# Patient Record
Sex: MALE | Race: ASIAN | NOT HISPANIC OR LATINO | Employment: FULL TIME
[De-identification: names, ages, dates, MRNs, and addresses within clinical notes are randomized per-mention and may not be internally consistent; named-entity substitution may affect disease eponyms.]

---

## 2018-06-29 ENCOUNTER — HOSPITAL ENCOUNTER (OUTPATIENT)
Dept: LAB | Age: 28
Setting detail: SPECIMEN
Discharge: HOME | End: 2018-06-29

## 2018-06-29 ENCOUNTER — AMBULATORY - HEALTHEAST (OUTPATIENT)
Dept: LAB | Facility: CLINIC | Age: 28
End: 2018-06-29

## 2018-06-29 DIAGNOSIS — Z02.89 REFUGEE HEALTH EXAMINATION: ICD-10-CM

## 2018-06-29 LAB
ALBUMIN SERPL-MCNC: 4.1 G/DL (ref 3.5–5)
ALP SERPL-CCNC: 76 U/L (ref 45–120)
ALT SERPL W P-5'-P-CCNC: 19 U/L (ref 0–45)
ANION GAP SERPL CALCULATED.3IONS-SCNC: 12 MMOL/L (ref 5–18)
AST SERPL W P-5'-P-CCNC: 16 U/L (ref 0–40)
BASOPHILS # BLD AUTO: 0.1 THOU/UL (ref 0–0.2)
BASOPHILS NFR BLD AUTO: 1 % (ref 0–2)
BILIRUB SERPL-MCNC: 0.9 MG/DL (ref 0–1)
BUN SERPL-MCNC: 15 MG/DL (ref 8–22)
CALCIUM SERPL-MCNC: 9.6 MG/DL (ref 8.5–10.5)
CHLORIDE BLD-SCNC: 103 MMOL/L (ref 98–107)
CO2 SERPL-SCNC: 24 MMOL/L (ref 22–31)
CREAT SERPL-MCNC: 0.88 MG/DL (ref 0.7–1.3)
EOSINOPHIL # BLD AUTO: 0.3 THOU/UL (ref 0–0.4)
EOSINOPHIL NFR BLD AUTO: 5 % (ref 0–6)
ERYTHROCYTE [DISTWIDTH] IN BLOOD BY AUTOMATED COUNT: 12.6 % (ref 11–14.5)
GFR SERPL CREATININE-BSD FRML MDRD: >60 ML/MIN/1.73M2
GLUCOSE BLD-MCNC: 90 MG/DL (ref 70–125)
HAV IGG SER QL IA: POSITIVE
HBV CORE AB SERPL QL IA: NEGATIVE
HBV SURFACE AG SERPL QL IA: NEGATIVE
HCT VFR BLD AUTO: 48.5 % (ref 40–54)
HCV AB SERPL QL IA: NEGATIVE
HEPATITIS B SURFACE ANTIBODY LHE- HISTORICAL: POSITIVE
HGB BLD-MCNC: 16.2 G/DL (ref 14–18)
HIV 1+2 AB+HIV1 P24 AG SERPL QL IA: NEGATIVE
LDLC SERPL CALC-MCNC: 88 MG/DL
LYMPHOCYTES # BLD AUTO: 1.7 THOU/UL (ref 0.8–4.4)
LYMPHOCYTES NFR BLD AUTO: 34 % (ref 20–40)
MCH RBC QN AUTO: 29.8 PG (ref 27–34)
MCHC RBC AUTO-ENTMCNC: 33.4 G/DL (ref 32–36)
MCV RBC AUTO: 89 FL (ref 80–100)
MONOCYTES # BLD AUTO: 0.5 THOU/UL (ref 0–0.9)
MONOCYTES NFR BLD AUTO: 9 % (ref 2–10)
NEUTROPHILS # BLD AUTO: 2.6 THOU/UL (ref 2–7.7)
NEUTROPHILS NFR BLD AUTO: 51 % (ref 50–70)
PLATELET # BLD AUTO: 227 THOU/UL (ref 140–440)
PMV BLD AUTO: 11.9 FL (ref 8.5–12.5)
POTASSIUM BLD-SCNC: 3.9 MMOL/L (ref 3.5–5)
PROT SERPL-MCNC: 7.5 G/DL (ref 6–8)
RBC # BLD AUTO: 5.43 MILL/UL (ref 4.4–6.2)
SODIUM SERPL-SCNC: 139 MMOL/L (ref 136–145)
WBC: 5.2 THOU/UL (ref 4–11)

## 2018-06-30 LAB — STRONGYLOIDES IGG SER IA-ACNC: 0.62 IV

## 2018-07-02 LAB
QTF INTERPRETATION: NORMAL
QTF MITOGEN - NIL: 7.75 IU/ML
QTF NIL: 0.08 IU/ML
QTF RESULT: NEGATIVE
QTF TB ANTIGEN - NIL: 0.01 IU/ML
VZV IGG SER QL IA: POSITIVE

## 2018-07-03 LAB — SCHISTOSOMA IGG SER QL: POSITIVE

## 2018-07-12 ENCOUNTER — OFFICE VISIT - HEALTHEAST (OUTPATIENT)
Dept: FAMILY MEDICINE | Facility: CLINIC | Age: 28
End: 2018-07-12

## 2018-08-01 ENCOUNTER — HOSPITAL ENCOUNTER (OUTPATIENT)
Dept: LAB | Age: 28
Setting detail: SPECIMEN
Discharge: HOME | End: 2018-08-01

## 2018-08-01 ENCOUNTER — OFFICE VISIT - HEALTHEAST (OUTPATIENT)
Dept: FAMILY MEDICINE | Facility: CLINIC | Age: 28
End: 2018-08-01

## 2018-08-01 DIAGNOSIS — R94.31 NONSPECIFIC ABNORMAL ELECTROCARDIOGRAM (ECG) (EKG): ICD-10-CM

## 2018-08-01 DIAGNOSIS — B65.9 SCHISTOSOMIASIS: ICD-10-CM

## 2018-08-01 DIAGNOSIS — Z02.89 REFUGEE HEALTH EXAMINATION: ICD-10-CM

## 2018-08-01 DIAGNOSIS — K62.5 BRBPR (BRIGHT RED BLOOD PER RECTUM): ICD-10-CM

## 2018-08-01 DIAGNOSIS — Z00.00 ROUTINE GENERAL MEDICAL EXAMINATION AT A HEALTH CARE FACILITY: ICD-10-CM

## 2018-08-01 DIAGNOSIS — R55 SYNCOPE, UNSPECIFIED SYNCOPE TYPE: ICD-10-CM

## 2018-08-01 LAB
ANION GAP SERPL CALCULATED.3IONS-SCNC: 10 MMOL/L (ref 5–18)
BASOPHILS # BLD AUTO: 0 THOU/UL (ref 0–0.2)
BASOPHILS NFR BLD AUTO: 1 % (ref 0–2)
BUN SERPL-MCNC: 10 MG/DL (ref 8–22)
CALCIUM SERPL-MCNC: 9.5 MG/DL (ref 8.5–10.5)
CHLORIDE BLD-SCNC: 106 MMOL/L (ref 98–107)
CO2 SERPL-SCNC: 26 MMOL/L (ref 22–31)
CREAT SERPL-MCNC: 0.81 MG/DL (ref 0.7–1.3)
EOSINOPHIL # BLD AUTO: 0.3 THOU/UL (ref 0–0.4)
EOSINOPHIL NFR BLD AUTO: 5 % (ref 0–6)
ERYTHROCYTE [DISTWIDTH] IN BLOOD BY AUTOMATED COUNT: 12.3 % (ref 11–14.5)
FERRITIN SERPL-MCNC: 192 NG/ML (ref 27–300)
GFR SERPL CREATININE-BSD FRML MDRD: >60 ML/MIN/1.73M2
GLUCOSE BLD-MCNC: 77 MG/DL (ref 70–125)
HCT VFR BLD AUTO: 46.8 % (ref 40–54)
HGB BLD-MCNC: 16 G/DL (ref 14–18)
LYMPHOCYTES # BLD AUTO: 2 THOU/UL (ref 0.8–4.4)
LYMPHOCYTES NFR BLD AUTO: 36 % (ref 20–40)
MCH RBC QN AUTO: 29.7 PG (ref 27–34)
MCHC RBC AUTO-ENTMCNC: 34.2 G/DL (ref 32–36)
MCV RBC AUTO: 87 FL (ref 80–100)
MONOCYTES # BLD AUTO: 0.5 THOU/UL (ref 0–0.9)
MONOCYTES NFR BLD AUTO: 9 % (ref 2–10)
NEUTROPHILS # BLD AUTO: 2.7 THOU/UL (ref 2–7.7)
NEUTROPHILS NFR BLD AUTO: 49 % (ref 50–70)
PLATELET # BLD AUTO: 222 THOU/UL (ref 140–440)
PMV BLD AUTO: 8.7 FL (ref 7–10)
POTASSIUM BLD-SCNC: 3.8 MMOL/L (ref 3.5–5)
RBC # BLD AUTO: 5.39 MILL/UL (ref 4.4–6.2)
SODIUM SERPL-SCNC: 142 MMOL/L (ref 136–145)
WBC: 5.5 THOU/UL (ref 4–11)

## 2018-08-01 RX ORDER — ACETAMINOPHEN 325 MG/1
650 TABLET ORAL EVERY 4 HOURS PRN
Qty: 100 TABLET | Refills: 2 | Status: SHIPPED | OUTPATIENT
Start: 2018-08-01

## 2018-08-01 ASSESSMENT — MIFFLIN-ST. JEOR: SCORE: 1476.63

## 2018-08-02 LAB
ATRIAL RATE - MUSE: 75 BPM
DIASTOLIC BLOOD PRESSURE - MUSE: NORMAL MMHG
INTERPRETATION ECG - MUSE: NORMAL
P AXIS - MUSE: 47 DEGREES
PR INTERVAL - MUSE: 154 MS
QRS DURATION - MUSE: 98 MS
QT - MUSE: 386 MS
QTC - MUSE: 431 MS
R AXIS - MUSE: 68 DEGREES
SYSTOLIC BLOOD PRESSURE - MUSE: NORMAL MMHG
T AXIS - MUSE: 23 DEGREES
VENTRICULAR RATE- MUSE: 75 BPM

## 2018-08-03 ENCOUNTER — COMMUNICATION - HEALTHEAST (OUTPATIENT)
Dept: FAMILY MEDICINE | Facility: CLINIC | Age: 28
End: 2018-08-03

## 2018-08-14 ENCOUNTER — OFFICE VISIT - HEALTHEAST (OUTPATIENT)
Dept: CARDIOLOGY | Facility: CLINIC | Age: 28
End: 2018-08-14

## 2018-08-14 DIAGNOSIS — T67.1XXA HEAT SYNCOPE, INITIAL ENCOUNTER: ICD-10-CM

## 2018-08-14 ASSESSMENT — MIFFLIN-ST. JEOR: SCORE: 1469.9

## 2018-08-15 LAB
ATRIAL RATE - MUSE: 65 BPM
DIASTOLIC BLOOD PRESSURE - MUSE: NORMAL MMHG
INTERPRETATION ECG - MUSE: NORMAL
P AXIS - MUSE: 33 DEGREES
PR INTERVAL - MUSE: 152 MS
QRS DURATION - MUSE: 94 MS
QT - MUSE: 378 MS
QTC - MUSE: 393 MS
R AXIS - MUSE: 61 DEGREES
SYSTOLIC BLOOD PRESSURE - MUSE: NORMAL MMHG
T AXIS - MUSE: 33 DEGREES
VENTRICULAR RATE- MUSE: 65 BPM

## 2018-08-22 ENCOUNTER — OFFICE VISIT - HEALTHEAST (OUTPATIENT)
Dept: FAMILY MEDICINE | Facility: CLINIC | Age: 28
End: 2018-08-22

## 2018-08-29 ENCOUNTER — COMMUNICATION - HEALTHEAST (OUTPATIENT)
Dept: ADMINISTRATIVE | Facility: CLINIC | Age: 28
End: 2018-08-29

## 2018-10-18 ENCOUNTER — HOSPITAL ENCOUNTER (OUTPATIENT)
Dept: CARDIOLOGY | Facility: HOSPITAL | Age: 28
Discharge: HOME | End: 2018-10-18
Attending: INTERNAL MEDICINE

## 2018-10-18 DIAGNOSIS — T67.1XXA HEAT SYNCOPE, INITIAL ENCOUNTER: ICD-10-CM

## 2018-10-18 ASSESSMENT — MIFFLIN-ST. JEOR: SCORE: 1469.9

## 2018-10-19 LAB
AORTIC ROOT: 3.3 CM
BSA FOR ECHO PROCEDURE: 1.66 M2
CV BLOOD PRESSURE: NORMAL MMHG
CV ECHO HEIGHT: 62.2 IN
CV ECHO WEIGHT: 138 LBS
DOP CALC LVOT AREA: 4.15 CM2
DOP CALC LVOT DIAMETER: 2.3 CM
DOP CALC LVOT STROKE VOLUME: 64.4 CM3
DOP CALCLVOT PEAK VEL VTI: 15.5 CM
EJECTION FRACTION: 56 % (ref 55–75)
FRACTIONAL SHORTENING: 30.2 % (ref 28–44)
INTERVENTRICULAR SEPTUM IN END DIASTOLE: 1 CM (ref 0.6–1)
IVS/PW RATIO: 1.3
LA AREA 1: 12.8 CM2
LA AREA 2: 15 CM2
LEFT ATRIUM LENGTH: 4.97 CM
LEFT ATRIUM SIZE: 2.8 CM
LEFT ATRIUM TO AORTIC ROOT RATIO: 0.85 NO UNITS
LEFT ATRIUM VOLUME INDEX: 19.8 ML/M2
LEFT ATRIUM VOLUME: 32.8 ML
LEFT VENTRICLE CARDIAC INDEX: 2.7 L/MIN/M2
LEFT VENTRICLE CARDIAC OUTPUT: 4.5 L/MIN
LEFT VENTRICLE DIASTOLIC VOLUME INDEX: 69.3 CM3/M2 (ref 34–74)
LEFT VENTRICLE DIASTOLIC VOLUME: 115 CM3 (ref 62–150)
LEFT VENTRICLE HEART RATE: 70 BPM
LEFT VENTRICLE MASS INDEX: 74.3 G/M2
LEFT VENTRICLE SYSTOLIC VOLUME INDEX: 30.7 CM3/M2 (ref 11–31)
LEFT VENTRICLE SYSTOLIC VOLUME: 51 CM3 (ref 21–61)
LEFT VENTRICULAR INTERNAL DIMENSION IN DIASTOLE: 4.3 CM (ref 4.2–5.8)
LEFT VENTRICULAR INTERNAL DIMENSION IN SYSTOLE: 3 CM (ref 2.5–4)
LEFT VENTRICULAR MASS: 123.3 G
LEFT VENTRICULAR OUTFLOW TRACT MEAN GRADIENT: 1 MMHG
LEFT VENTRICULAR OUTFLOW TRACT MEAN VELOCITY: 49 CM/S
LEFT VENTRICULAR POSTERIOR WALL IN END DIASTOLE: 0.8 CM (ref 0.6–1)
LV STROKE VOLUME INDEX: 38.8 ML/M2
MITRAL VALVE E/A RATIO: 1.4
MV AVERAGE E/E' RATIO: 4.6 CM/S
MV DECELERATION TIME: 148 MS
MV E'TISSUE VEL-LAT: 15 CM/S
MV E'TISSUE VEL-MED: 9.46 CM/S
MV LATERAL E/E' RATIO: 3.8
MV MEDIAL E/E' RATIO: 6
MV PEAK A VELOCITY: 41.3 CM/S
MV PEAK E VELOCITY: 56.8 CM/S
NUC REST DIASTOLIC VOLUME INDEX: 2212.8 LBS
NUC REST SYSTOLIC VOLUME INDEX: 62.21 IN
TRICUSPID REGURGITATION PEAK PRESSURE GRADIENT: 11.3 MMHG
TRICUSPID VALVE ANULAR PLANE SYSTOLIC EXCURSION: 1.9 CM
TRICUSPID VALVE PEAK REGURGITANT VELOCITY: 168 CM/S

## 2018-10-26 ENCOUNTER — COMMUNICATION - HEALTHEAST (OUTPATIENT)
Dept: CARDIOLOGY | Facility: CLINIC | Age: 28
End: 2018-10-26

## 2019-07-29 ENCOUNTER — OFFICE VISIT - HEALTHEAST (OUTPATIENT)
Dept: FAMILY MEDICINE | Facility: CLINIC | Age: 29
End: 2019-07-29

## 2019-07-29 DIAGNOSIS — Z02.89 HISTORY AND PHYSICAL EXAMINATION, IMMIGRATION: ICD-10-CM

## 2019-07-29 ASSESSMENT — MIFFLIN-ST. JEOR: SCORE: 1469.68

## 2020-09-10 ENCOUNTER — OFFICE VISIT - HEALTHEAST (OUTPATIENT)
Dept: FAMILY MEDICINE | Facility: CLINIC | Age: 30
End: 2020-09-10

## 2020-09-10 DIAGNOSIS — Z23 IMMUNIZATION DUE: ICD-10-CM

## 2020-09-10 DIAGNOSIS — R20.2 PARESTHESIA OF RIGHT ARM: ICD-10-CM

## 2020-09-10 ASSESSMENT — MIFFLIN-ST. JEOR: SCORE: 1488.96

## 2020-09-25 ENCOUNTER — HOSPITAL ENCOUNTER (OUTPATIENT)
Dept: PHYSICAL MEDICINE AND REHAB | Facility: CLINIC | Age: 30
Discharge: HOME | End: 2020-09-25
Attending: FAMILY MEDICINE

## 2020-09-25 DIAGNOSIS — R20.2 PARESTHESIA OF RIGHT ARM: ICD-10-CM

## 2020-10-07 ENCOUNTER — COMMUNICATION - HEALTHEAST (OUTPATIENT)
Dept: FAMILY MEDICINE | Facility: CLINIC | Age: 30
End: 2020-10-07

## 2020-10-07 DIAGNOSIS — R20.2 PARESTHESIA OF RIGHT ARM: ICD-10-CM

## 2020-10-27 ENCOUNTER — AMBULATORY - HEALTHEAST (OUTPATIENT)
Dept: FAMILY MEDICINE | Facility: CLINIC | Age: 30
End: 2020-10-27

## 2020-10-27 DIAGNOSIS — G56.01 CARPAL TUNNEL SYNDROME OF RIGHT WRIST: ICD-10-CM

## 2020-10-28 ENCOUNTER — COMMUNICATION - HEALTHEAST (OUTPATIENT)
Dept: FAMILY MEDICINE | Facility: CLINIC | Age: 30
End: 2020-10-28

## 2021-05-30 NOTE — PROGRESS NOTES
.  Assessment/Plan:        Diagnoses and associated orders for this visit:      History and physical examination, immigration- Immunizations reviewed and are UTD.   USCIS I-693 completed and given to pt in sealed envelope.  Copy scanned into chart, copy given to pt for their records.  RTC to PCP for routine scheduled care, sooner prn.           Subjective:    Patient ID: Alivia Dudley is a 29 y.o. male    HPI Pt is here for a green card exam, came to [place] as a refugee.  No concerns about his health.     The following portions of the patient's history were reviewed and updated as appropriate: allergies, current medications, past family history, past medical history, past social history, past surgical history and problem list.    Review of Systems      Neg other than HPI.     Objective:    Physical Exam          Patient is in no apparent physical distress.  Vitals are as recorded.  Head and face are normal.  Conjunctiva are clear.  Neck is without adenopathy or masses.Cardiovascular :  Regular rate and rhythm with no murmurs.  Lungs are clear with good air movement bilaterally.  Extremities are without edema.  Gait is normal.  Skin is without rashes.  Mood and affect are appropriate.

## 2021-06-01 VITALS — HEIGHT: 62 IN | BODY MASS INDEX: 25.45 KG/M2 | WEIGHT: 138.3 LBS

## 2021-06-01 VITALS — WEIGHT: 139.8 LBS | HEIGHT: 62 IN | BODY MASS INDEX: 25.73 KG/M2

## 2021-06-02 VITALS — WEIGHT: 138.3 LBS | HEIGHT: 62 IN | BODY MASS INDEX: 25.45 KG/M2

## 2021-06-03 VITALS — BODY MASS INDEX: 25.44 KG/M2 | WEIGHT: 138.25 LBS | HEIGHT: 62 IN

## 2021-06-04 VITALS
SYSTOLIC BLOOD PRESSURE: 118 MMHG | BODY MASS INDEX: 26.22 KG/M2 | RESPIRATION RATE: 16 BRPM | WEIGHT: 142.5 LBS | DIASTOLIC BLOOD PRESSURE: 74 MMHG | HEIGHT: 62 IN | HEART RATE: 76 BPM | TEMPERATURE: 97.7 F

## 2021-06-11 NOTE — PROGRESS NOTES
"ASSESSMENT and plan   1. Paresthesia of right arm patient notes numbness every morning.  This radiates from his forearm and down to his hand.  He is never had these symptoms were denies any accidents.  Amitriptyline started EMG scheduled for the patient amitriptyline (ELAVIL) 25 MG tablet    EMG- Right Arm   2. Immunization due he agrees for vaccination today. Influenza, Seasonal Quad, PF =/> 6months             There are no Patient Instructions on file for this visit.    No orders of the defined types were placed in this encounter.    There are no discontinued medications.    No follow-ups on file.    CHIEF COMPLAINT:  Chief Complaint   Patient presents with     Arm Pain     right       HISTORY OF PRESENT ILLNESS:  Alivia is a 30 y.o. male who is here today to discuss right arm numbness.  This is been going on for approximately 1 month.  It occurs early in the morning and last for a few hours at a time.  He is not had this discomfort before he denies any injuries to his right arm, right shoulder or right forearm.  He notes no weakness in his right arm.  The symptoms are very stable they are not increasing or decreasing.  No relieving or aggravating factors noted    REVIEW OF SYSTEMS:     Neuro numbness and tingling noted in his right upper extremity.  10 point review of  All other systems are negative.    PFSH:    He works in a manufacturing plant    TOBACCO USE:  Social History     Tobacco Use   Smoking Status Never Smoker   Smokeless Tobacco Never Used       VITALS:  Vitals:    09/10/20 0955   BP: 118/74   Pulse: 76   Resp: 16   Temp: 97.7  F (36.5  C)   TempSrc: Oral   Weight: 142 lb 8 oz (64.6 kg)   Height: 5' 2.21\" (1.58 m)     Wt Readings from Last 3 Encounters:   09/10/20 142 lb 8 oz (64.6 kg)   02/22/20 141 lb (64 kg)   07/29/19 138 lb 4 oz (62.7 kg)       PHYSICAL EXAM:  Interactive male sitting comfortably in exam room no acute distress  HEENT neck supple mucous members moist there is no lymph enlargement " noted in the neck  Musculoskeletal system no tenderness elicited when I palpate his right deltoid biceps triceps area.  He has tenderness in the right brachial radialis muscle.  Neuro cranial nerves II to XII intact motor tone in the right upper extremity is normal.  Power of his right triceps and biceps is 5 out of 5  Tinel's and [facility]'s tests are negative    DATA REVIEWED:  Additional History from Old Records Summarized (2): 2  Reviewed ED ED notes from Dr. Urrutia EKG was done no abnormalities noted from previous EKG  Decision to Obtain Records (1): 0  Radiology Tests Summarized or Ordered (1): 0  Labs Reviewed or Ordered (1): 1  Medicine Test Summarized or Ordered (1): 0  Independent Review of EKG or X-RAY(2 each): 0    The visit lasted a total of 15 minutes face to face with the patient. Over 50% of the time was spent counseling and educating the patient about   Paresthesia.    MEDICATIONS:  Current Outpatient Medications   Medication Sig Dispense Refill     acetaminophen (TYLENOL) 325 MG tablet Take 2 tablets (650 mg total) by mouth every 4 (four) hours as needed for pain. 100 tablet 2     viscous lidocaine HC (LIDOCAINE) 2 % Soln viscous solution Take 15 mL by mouth 3 (three) times a day. Swish and spit. 100 mL 0     No current facility-administered medications for this visit.      Emmanuel cherry md

## 2021-06-11 NOTE — PATIENT INSTRUCTIONS - HE
Thank you for choosing the [facility] for your EMG testing.    The ordering provider will receive your final EMG results within the next few days.  Please follow up with your provider for the results and further treatment recommendations.

## 2021-06-11 NOTE — PROGRESS NOTES
Patient presents at the request of Dr.Anil Ross for a right upper extremity EMG.  He has had severe right arm numbness tingling all fingers involved with pain and numbness up to the shoulder of the right hand.  He is right-handed.  No weakness in the right arm.  On exam no weakness muscle atrophy or hyperreflexia.    EMG/NCS  results: Please see scanned full report.    Comment NCS: Abnormal study:    1.  Absent right median SNAP and transcarpal study.  2.  Prolonged right median CMAP latency.  3.  Normal right ulnar and radial SNAPs, ulnar transcarpal study and ulnar CMAP.    Comment EMG: Normal study.  1.  Normal needle EMG right upper extremity.    Interpretation: Abnormal study: There is electrodiagnostic evidence of:    1.  Right median neuropathy at the wrist consistent with entrapment in the carpal tunnel, moderate in severity.    2. There is no electrodiagnostic evidence of cervical radiculopathy, brachial plexopathy, or ulnar neuropathy in the right upper extremity.      Note: Patient may wish to trial carpal tunnel splint over-the-counter at night and will follow up with his primary care provider for further recommendations.    The testing was completed in its entirety by the physician.      It was our pleasure caring for your patient today, if there any questions or concerns please do not hesitate to contact us.

## 2021-06-12 NOTE — TELEPHONE ENCOUNTER
I called patient using language line to assist in scheduling his Neurosurgery appointment for his carpal tunnel.      Patient was wanting to get his EMG results before he schedules just because he feels his symptoms are improving.    Kindra Gaitan  10/28/2020

## 2021-06-12 NOTE — TELEPHONE ENCOUNTER
Refill Approved    Rx renewed per Medication Renewal Policy. Medication was last renewed on 9/10/20.    Amy Ortiz, [facility] Triage/Med Refill 10/9/2020     Requested Prescriptions   Pending Prescriptions Disp Refills     amitriptyline (ELAVIL) 25 MG tablet [Pharmacy Med Name: AMITRIPTYLINE 25MG TABLETS] 30 tablet 0     Sig: TAKE 1 TABLET(25 MG) BY MOUTH AT BEDTIME       Tricyclics/Misc Antidepressant/Antianxiety Meds Refill Protocol Passed - 10/7/2020  3:17 AM        Passed - PCP or prescribing provider visit in last year     Last office visit with prescriber/PCP: 9/10/2020 Emmanuel Ross MD OR same dept: 9/10/2020 Emmanuel Ross MD OR same specialty: 9/10/2020 Emmanuel Ross MD  Last physical: Visit date not found Last MTM visit: Visit date not found   Next visit within 3 mo: Visit date not found  Next physical within 3 mo: Visit date not found  Prescriber OR PCP: Emmanuel Ross MD  Last diagnosis associated with med order: 1. Paresthesia of right arm  - amitriptyline (ELAVIL) 25 MG tablet [Pharmacy Med Name: AMITRIPTYLINE 25MG TABLETS]; TAKE 1 TABLET(25 MG) BY MOUTH AT BEDTIME  Dispense: 30 tablet; Refill: 0    If protocol passes may refill for 12 months if within 3 months of last provider visit (or a total of 15 months).

## 2021-06-16 PROBLEM — K62.5 BRBPR (BRIGHT RED BLOOD PER RECTUM): Status: ACTIVE | Noted: 2018-08-01

## 2021-06-16 PROBLEM — R55 SYNCOPE, UNSPECIFIED SYNCOPE TYPE: Status: ACTIVE | Noted: 2018-08-01

## 2021-06-16 PROBLEM — R94.31 NONSPECIFIC ABNORMAL ELECTROCARDIOGRAM (ECG) (EKG): Status: ACTIVE | Noted: 2018-08-01

## 2021-06-16 PROBLEM — G56.01 CARPAL TUNNEL SYNDROME OF RIGHT WRIST: Status: ACTIVE | Noted: 2020-10-26

## 2021-06-19 NOTE — PROGRESS NOTES
REFUGEE HEALTH SCREEN    Subjective:      Alivia Dudley is a 28 y.o. male who presents for a refugee screening exam.     Concerns: dizziness and headache at times - mostly when he's at rest, when he stands from sitting but not when he gets up first thing in the morning; stays an hour; he's never taken any medication; he drinks water - does not deprive himself of water.    Since arrival:  Fever: No  Abdo pain: Yes, after playing sports  Diarrhea: No  Sob: No  Cough: No   sx: No, bladder is fine  Vision problem?:no  Hearing problem?: right ear may have problems (water got into it when patient was younger, patient states possible ear infection)      h/o head injury as a child - had a cut but no loss of consciousness    Past Medical History:   Diagnosis Date     Ear problem     chronic     Malaria      Rectal bleeding 08/01/2018     Syncope     happened twice, once age 21         Ob/GynHx:  No LMP for male patient.  Birth control: abstinence    Meds:  none    Social History     Social History     Marital status: Single     Spouse name: N/A     Number of children: N/A     Years of education: N/A     Social History Main Topics     Smoking status: Never Smoker     Smokeless tobacco: Never Used     Alcohol use No     Drug use: No     Sexual activity: No     Other Topics Concern     None     Social History Narrative    As of 8/1/18        [place]rival in [place]: 6[place]2018        Living situation: lives with his sister and her family ( and one child), in an apartment            Family not in [place]: parent, brother all in the [place]    Family in [place]: sister        Languages spoken: Karenni, some Scottish            Past employment: /teacher- primary            Place of birth: [facility]            Education: 10th grade            Episcopal: Temple           Family Med Hx:  Section completed      Immunization History   Administered Date(s) Administered     Hep B, Adult 08/01/2018     Hep B, historic 02/28/2018, 03/29/2018      "Influenza, inj, historic,unspecified 02/28/2018     MMR 02/28/2018, 03/29/2018     Td,adult,historic,unspecified 02/28/2018, 03/29/2018     Tdap 08/01/2018             REVIEW OF SYSTEMS  General:  No weight changes, fatigue  HEENT:  no HA,  no vision changes, URI sx  Respiratory:  no cough, dyspnea  Cardiovascular: no chest pain, palpitations  Gastrointestinal: no nausea/vomiting, diarrhea/constipation, melena  : no dysuria  Neurologic: no seizures, focal weakness, tremors  Skin: no rashes             Objective:         Vitals:    08/01/18 1452   BP: 128/82   Pulse: 67   Resp: 12   Temp: 99.1  F (37.3  C)   TempSrc: Oral   SpO2: 99%   Weight: 139 lb 12.8 oz (63.4 kg)   Height: 5' 2.21\" (1.58 m)     Body mass index is 25.4 kg/(m^2).    OBJECTIVE:  Vitals listed above  General appearance: pleasant, hydrated, nontoxic-appearing  ENT: PERRL, throat clear  Neck: neck supple, no lymphadenopathy, no thyromegaly  Lungs: lungs clear to auscultation bilaterally, no wheezes or rhonchi  Heart: regular rate and rhythm, no murmurs  Abdomen: +BS, soft, no masses, not distended/nontender  Rectum: no external hemorrhoids, no masses, no fissures, nontender  Neuro: no focal deficits, CN II-XII grossly intact, alert and oriented, +2/4 DTRs in patellar tendons  Psych:  Mood seems good      Recent Results (from the past 672 hour(s))   Ferritin    Collection Time: 08/01/18  3:24 PM   Result Value Ref Range    Ferritin 192 27 - 300 ng/mL   Basic Metabolic Panel    Collection Time: 08/01/18  3:24 PM   Result Value Ref Range    Sodium 142 136 - 145 mmol/L    Potassium 3.8 3.5 - 5.0 mmol/L    Chloride 106 98 - 107 mmol/L    CO2 26 22 - 31 mmol/L    Anion Gap, Calculation 10 5 - 18 mmol/L    Glucose 77 70 - 125 mg/dL    Calcium 9.5 8.5 - 10.5 mg/dL    BUN 10 8 - 22 mg/dL    Creatinine 0.81 0.70 - 1.30 mg/dL    GFR MDRD Af Amer >60 >60 mL/min/1.73m2    GFR MDRD Non Af Amer >60 >60 mL/min/1.73m2   HM1 (CBC with Diff)    Collection Time: " 08/01/18  3:24 PM   Result Value Ref Range    WBC 5.5 4.0 - 11.0 thou/uL    RBC 5.39 4.40 - 6.20 mill/uL    Hemoglobin 16.0 14.0 - 18.0 g/dL    Hematocrit 46.8 40.0 - 54.0 %    MCV 87 80 - 100 fL    MCH 29.7 27.0 - 34.0 pg    MCHC 34.2 32.0 - 36.0 g/dL    RDW 12.3 11.0 - 14.5 %    Platelets 222 140 - 440 thou/uL    MPV 8.7 7.0 - 10.0 fL    Neutrophils % 49 (L) 50 - 70 %    Lymphocytes % 36 20 - 40 %    Monocytes % 9 2 - 10 %    Eosinophils % 5 0 - 6 %    Basophils % 1 0 - 2 %    Neutrophils Absolute 2.7 2.0 - 7.7 thou/uL    Lymphocytes Absolute 2.0 0.8 - 4.4 thou/uL    Monocytes Absolute 0.5 0.0 - 0.9 thou/uL    Eosinophils Absolute 0.3 0.0 - 0.4 thou/uL    Basophils Absolute 0.0 0.0 - 0.2 thou/uL   Electrocardiogram Perform and Read    Collection Time: 08/01/18  4:24 PM   Result Value Ref Range    SYSTOLIC BLOOD PRESSURE  mmHg    DIASTOLIC BLOOD PRESSURE  mmHg    VENTRICULAR RATE 75 BPM    ATRIAL RATE 75 BPM    P-R INTERVAL 154 ms    QRS DURATION 98 ms    Q-T INTERVAL 386 ms    QTC CALCULATION (BEZET) 431 ms    P Axis 47 degrees    R AXIS 68 degrees    T AXIS 23 degrees    MUSE DIAGNOSIS       Normal sinus rhythm  Normal ECG  No previous ECGs available       EKG: LVH but NSR, read by me       Mental Health screening:  Q1. Mood low or feeling sad: Yes, d/t bloody stool  Q2, Thinking too much: No, just about the blood stool  Sleeping well: Yes  Q3. Bad dreams/nightmares: No  Q4. Have you been avoiding situations that remind you of the past? Yes  Q5: Are there things that make it difficult to beverly what you need to do on a daily basis or be able to School/work/daily life: No  Referral Indicated: No   (?? no, refused, yes - internal, yes- external)    Assessment/plan:     Refugee Screening - Reviewed overseas paperwork.  Confirmed pre-departure anti-parasite treatment.  Reviewed refugee screening labs.  Immunization review and update done, see flowsheet.  Refugee mental health screen done, see results above.  Reviewed  healthy lifestyle issues and resettlement issues.  Encouraged dental follow-up in coordination with [facility].      Diagnoses and all orders for this visit:    BRBPR (bright red blood per rectum)  -     HM1(CBC and Differential)  -     Ferritin  -     Basic Metabolic Panel  -     HM1 (CBC with Diff)  -     Ambulatory referral for Colonoscopy  Teaching on prep done here at clinic today    Syncope, unspecified syncope type  -     Electrocardiogram Perform and Read  -     Ambulatory referral to Cardiology  Might need an echo    Routine general medical examination at a health care facility    Refugee health examination  -     Tdap vaccine,  8yo or older,  IM  -     Hepatitis B Vaccine Age 20 years and above    Nonspecific abnormal electrocardiogram (ECG) (EKG)  -     Ambulatory referral to Cardiology    Other orders- schistosomiasis  -     praziquantel (BILTRICIDE) 600 mg tablet; Take 2 tablets (1,200 mg total) by mouth 2 (two) times a day for 1 day.  Dispense: 4 tablet; Refill: 0  -     acetaminophen (TYLENOL) 325 MG tablet; Take 2 tablets (650 mg total) by mouth every 4 (four) hours as needed for pain.  Dispense: 100 tablet; Refill: 2      1. [facility] 28 [facility] [place] to the [place].  He has had bright red blood per rectum x 3 years - will check this out with a colonoscopy.  He's had a few episodes of syncope. Refer to cardiology.    Shots: ordered    2. Refugee screening exam - labs as noted below            Teaching about DUI/DV  Handouts given     spent 70 with the patient - 35 min on initial exam/exablishing care and 35min on rectal bleeding and syncope.

## 2021-06-19 NOTE — PROGRESS NOTES
"[facility] Note    Assessment:    Alivia Dudley is a 28 y.o. old male with h/o malaria, BRBPR, and syncope, here for w/u of syncope.    Plan:  # Syncope - very difficult to discern history, although w/ some suggestion of prodrome c/w Neurocardiogenic (vasovagal or POTS) etiology; at the same time, would like to r/o structural and conduction abnormality by getting a TTE and a Holter as an initial screen;  - if w/u is entirely unremarkable, would consider seizure d/o evaluation  - in the meantime, encourage hydration, equilibration prior to positional changes.    TOO KRAUSDIRK  [facility]  [phone]  ______________________________________________________________________    Subjective:  CC: Syncope    I had the opportunity to see Alivia Dudley at the [facility]. Alivia Dudley is a 28 y.o. male with a known history of syncope.    From what I can gather by translation on the  line, it sounds like he has syncope quite frequently, which sometimes lasts for quite a long time (at least 4 times when he would wake up eventually in the hospital), and \"countless\" episodes of shorter duration. Every few episodes, he had sought medical attention but no underlying cause has ever been found. He thinks that on the days when he has been more sedentary, when he first stands up, he can get really lightheaded or pass out almost every time, whereas if he is active, syncope is less frequent. However, if he overdoes activity, he is limited by abdominal pain. He endorses prodrome of nausea/lightheadedness prior to passing out. Endorses CP lasting for a few days after heavy exertion, no SOB/orthopnea/PND/edema/ +N, no V, +D + BRBPR.     EKG w/ NSR at 65 w/ anterior J point elevations, QTC  393. Recent w/u + for some parasitic (strongyloided and schistosoma) and viral serologies (HB surface Ag, HAV) but no anemia, normal cre.  ______________________________________________________________________  Review " "of Systems:   As noted in HPI, all others reviewed and are negative      Problem List:  Patient Active Problem List   Diagnosis     BRBPR (bright red blood per rectum)     Syncope, unspecified syncope type     Nonspecific abnormal electrocardiogram (ECG) (EKG)     Medical History:  Past Medical History:   Diagnosis Date     Ear problem     chronic     Malaria      Rectal bleeding 08/01/2018     Syncope     happened twice, once age 21     Surgical History:  Past Surgical History:   Procedure Laterality Date     NO PAST SURGERIES       Social History:  Social History     Social History     Marital status: Single     Spouse name: N/A     Number of children: N/A     Years of education: N/A     Occupational History     Not on file.     Social History Main Topics     Smoking status: Never Smoker     Smokeless tobacco: Never Used     Alcohol use No     Drug use: No     Sexual activity: No     Other Topics Concern     Not on file     Social History Narrative    As of 8/1/18        [place] in [place] 6[place]        Living situation: lives with his sister and her family ( and one child), in an apartment            Family [place] in [place]: parent, brother all in the [place]    Family in [place]: sister        Languages spoken: Karenni, some Macanese            Past employment: /teacher- primary            Place of birth: [facility]            Education: 10th grade            Taoist: Restorationism               Family History:  No family history on file.      Allergies:  No Known Allergies    Medications:  Current Outpatient Prescriptions   Medication Sig Dispense Refill     acetaminophen (TYLENOL) 325 MG tablet Take 2 tablets (650 mg total) by mouth every 4 (four) hours as needed for pain. 100 tablet 2     No current facility-administered medications for this visit.        Objective:   Vital signs:  /72 (Patient Site: Left Arm, Patient Position: Sitting, Cuff Size: Adult Regular)  Pulse 80  Resp 24  Ht 5' 2.21\" (1.58 m)  " Wt 138 lb 4.8 oz (62.7 kg)  BMI 25.12 kg/m2      Physical Exam:    GENERAL APPEARANCE: Alert, cooperative and in no acute distress.   HEENT: No scleral icterus. Oral mucuos membranes pink and moist.   NECK: JVP 5. No Hepatojugular reflux. Thyroid not visualized. No lymphadenopathy   CHEST: clear to auscultation   CARDIOVASCULAR: S1, S2 without murmur ,clicks or rubs. Brachial, radial and posterior tibial pulses are intact and symetric. No carotid bruits noted. No edema  ABDOMEN: Nontender. BS+. No bruits.   SKIN: No Xanthelasma   Musculoskeletal: No cyanosis, clubbing or swelling.      Lab Results:  LIPIDS:  No results found for: CHOL  No results found for: HDL  No results found for: LDLCALC  No results found for: TRIG  No components found for: CHOLHDL    BMP:  Lab Results   Component Value Date    CREATININE 0.81 08/01/2018    BUN 10 08/01/2018     08/01/2018    K 3.8 08/01/2018     08/01/2018    CO2 26 08/01/2018         [facility]

## 2022-02-18 ENCOUNTER — APPOINTMENT (OUTPATIENT)
Dept: CT IMAGING | Facility: HOSPITAL | Age: 32
End: 2022-02-18
Attending: EMERGENCY MEDICINE
Payer: COMMERCIAL

## 2022-02-18 ENCOUNTER — HOSPITAL ENCOUNTER (EMERGENCY)
Facility: HOSPITAL | Age: 32
Discharge: HOME | End: 2022-02-18
Attending: EMERGENCY MEDICINE | Admitting: EMERGENCY MEDICINE
Payer: COMMERCIAL

## 2022-02-18 VITALS
SYSTOLIC BLOOD PRESSURE: 125 MMHG | TEMPERATURE: 98.4 F | OXYGEN SATURATION: 100 % | RESPIRATION RATE: 16 BRPM | HEART RATE: 82 BPM | BODY MASS INDEX: 27.98 KG/M2 | WEIGHT: 154 LBS | DIASTOLIC BLOOD PRESSURE: 74 MMHG

## 2022-02-18 DIAGNOSIS — K62.5 RECTAL BLEEDING: ICD-10-CM

## 2022-02-18 DIAGNOSIS — D50.9 IRON DEFICIENCY ANEMIA, UNSPECIFIED IRON DEFICIENCY ANEMIA TYPE: ICD-10-CM

## 2022-02-18 LAB
ALBUMIN SERPL-MCNC: 4 G/DL (ref 3.5–5)
ALP SERPL-CCNC: 70 U/L (ref 45–120)
ALT SERPL W P-5'-P-CCNC: 18 U/L (ref 0–45)
ANION GAP SERPL CALCULATED.3IONS-SCNC: 5 MMOL/L (ref 5–18)
AST SERPL W P-5'-P-CCNC: 20 U/L (ref 0–40)
BASOPHILS # BLD AUTO: 0.1 10E3/UL (ref 0–0.2)
BASOPHILS NFR BLD AUTO: 1 %
BILIRUB SERPL-MCNC: 0.3 MG/DL (ref 0–1)
BUN SERPL-MCNC: 13 MG/DL (ref 8–22)
CALCIUM SERPL-MCNC: 8.8 MG/DL (ref 8.5–10.5)
CHLORIDE BLD-SCNC: 107 MMOL/L (ref 98–107)
CO2 SERPL-SCNC: 27 MMOL/L (ref 22–31)
CREAT SERPL-MCNC: 0.92 MG/DL (ref 0.7–1.3)
EOSINOPHIL # BLD AUTO: 0.4 10E3/UL (ref 0–0.7)
EOSINOPHIL NFR BLD AUTO: 7 %
ERYTHROCYTE [DISTWIDTH] IN BLOOD BY AUTOMATED COUNT: 14.3 % (ref 10–15)
GFR SERPL CREATININE-BSD FRML MDRD: >90 ML/MIN/1.73M2
GLUCOSE BLD-MCNC: 97 MG/DL (ref 70–125)
HCT VFR BLD AUTO: 35.1 % (ref 40–53)
HGB BLD-MCNC: 10.8 G/DL (ref 13.3–17.7)
HOLD SPECIMEN: NORMAL
HOLD SPECIMEN: NORMAL
IMM GRANULOCYTES # BLD: 0 10E3/UL
IMM GRANULOCYTES NFR BLD: 0 %
INR PPP: 1.16 (ref 0.9–1.15)
LYMPHOCYTES # BLD AUTO: 2.4 10E3/UL (ref 0.8–5.3)
LYMPHOCYTES NFR BLD AUTO: 46 %
MCH RBC QN AUTO: 24.1 PG (ref 26.5–33)
MCHC RBC AUTO-ENTMCNC: 30.8 G/DL (ref 31.5–36.5)
MCV RBC AUTO: 78 FL (ref 78–100)
MONOCYTES # BLD AUTO: 0.6 10E3/UL (ref 0–1.3)
MONOCYTES NFR BLD AUTO: 11 %
NEUTROPHILS # BLD AUTO: 1.8 10E3/UL (ref 1.6–8.3)
NEUTROPHILS NFR BLD AUTO: 35 %
NRBC # BLD AUTO: 0 10E3/UL
NRBC BLD AUTO-RTO: 0 /100
PLATELET # BLD AUTO: 264 10E3/UL (ref 150–450)
POTASSIUM BLD-SCNC: 4.2 MMOL/L (ref 3.5–5)
PROT SERPL-MCNC: 7.6 G/DL (ref 6–8)
RBC # BLD AUTO: 4.48 10E6/UL (ref 4.4–5.9)
SODIUM SERPL-SCNC: 139 MMOL/L (ref 136–145)
WBC # BLD AUTO: 5.2 10E3/UL (ref 4–11)

## 2022-02-18 PROCEDURE — 258N000003 HC RX IP 258 OP 636: Performed by: EMERGENCY MEDICINE

## 2022-02-18 PROCEDURE — 80053 COMPREHEN METABOLIC PANEL: CPT | Performed by: EMERGENCY MEDICINE

## 2022-02-18 PROCEDURE — 85025 COMPLETE CBC W/AUTO DIFF WBC: CPT | Performed by: EMERGENCY MEDICINE

## 2022-02-18 PROCEDURE — 36415 COLL VENOUS BLD VENIPUNCTURE: CPT | Performed by: EMERGENCY MEDICINE

## 2022-02-18 PROCEDURE — 74174 CTA ABD&PLVS W/CONTRAST: CPT

## 2022-02-18 PROCEDURE — 96374 THER/PROPH/DIAG INJ IV PUSH: CPT | Mod: 59

## 2022-02-18 PROCEDURE — 250N000011 HC RX IP 250 OP 636: Performed by: EMERGENCY MEDICINE

## 2022-02-18 PROCEDURE — 99285 EMERGENCY DEPT VISIT HI MDM: CPT | Mod: 25

## 2022-02-18 PROCEDURE — 96361 HYDRATE IV INFUSION ADD-ON: CPT

## 2022-02-18 PROCEDURE — 85610 PROTHROMBIN TIME: CPT | Performed by: EMERGENCY MEDICINE

## 2022-02-18 RX ORDER — DIPHENHYDRAMINE HYDROCHLORIDE 50 MG/ML
25 INJECTION INTRAMUSCULAR; INTRAVENOUS ONCE
Status: COMPLETED | OUTPATIENT
Start: 2022-02-18 | End: 2022-02-18

## 2022-02-18 RX ORDER — IOPAMIDOL 755 MG/ML
100 INJECTION, SOLUTION INTRAVASCULAR ONCE
Status: COMPLETED | OUTPATIENT
Start: 2022-02-18 | End: 2022-02-18

## 2022-02-18 RX ADMIN — IOPAMIDOL 100 ML: 755 INJECTION, SOLUTION INTRAVENOUS at 19:59

## 2022-02-18 RX ADMIN — DIPHENHYDRAMINE HYDROCHLORIDE 25 MG: 50 INJECTION, SOLUTION INTRAMUSCULAR; INTRAVENOUS at 20:23

## 2022-02-18 RX ADMIN — SODIUM CHLORIDE 1000 ML: 9 INJECTION, SOLUTION INTRAVENOUS at 20:23

## 2022-02-18 ASSESSMENT — ENCOUNTER SYMPTOMS
BACK PAIN: 1
BLOOD IN STOOL: 1
ABDOMINAL PAIN: 0
LIGHT-HEADEDNESS: 1

## 2022-02-18 NOTE — ED PROVIDER NOTES
ED Triage Provider Note  [facility]  Encounter Date: Feb 18, 2022      The patient was seen in triage to expedite ED workup.     History:  No chief complaint on file.    Alivia Dudley is a 32 year old male who presents to the ED with one week low back diffuse crampy with blood in stool.    Review of Systems:  No fever.    Vitals:  /67   Pulse 87   Temp 98.4  F (36.9  C) (Oral)   Resp 16   Wt 69.9 kg (154 lb)   SpO2 100%   BMI 27.98 kg/m      Brief Exam:  Constitutional: Awake, alert, no acute distress apparent  HEENT: Atraumatic, normocephalic  PSYCH: Alert, oriented and good historian    Medical Decision Making:  Patient arriving to the ED with problem as above. A medical screening exam was performed. Orders initiated from triage  to expedite ED workup.             Sosa Valdivia MD  02/18/22  Emergency Medicine  [facility] EMERGENCY DEPARTMENT  [address] [place] [id number] [phone]  Dept: [phone]       Sosa Valdivia MD  02/18/22 0896

## 2022-02-18 NOTE — ED TRIAGE NOTES
Pt reports one week of lower mid back pain and blood in stool. Pt is currently having lower back pain. Pt states the blood is bright red and dark red. Pt denies any other complaint.

## 2022-02-19 NOTE — DISCHARGE INSTRUCTIONS
Your bleeding is not coming from a dangerous area in your stomach.  I suspect it is coming from the colon, and should notice slow improvement over the next couple of days.    It is important that you see  one week from now and have your hemoglobin rechecked.  If your dizziness gets worse, you feel like you might faint or fall over, please come back to the ER immediately.    It is important that you see a doctor in clinic and a GI doctor.  They can help find the spot that is causing the bleeding.  If you are not having luck getting an appointment with the gastroenterologist then your doctor at [facility] can help schedule this appointment.

## 2022-02-19 NOTE — ED PROVIDER NOTES
EMERGENCY DEPARTMENT ENCOUNTER      NAME: Alivia Dudley  AGE: 32 year old male  YOB: 1990  MRN: [id number]  EVALUATION DATE & TIME: 2/18/2022  6:57 PM    PCP: No Ref-Primary, Physician    ED PROVIDER: Kenroy Ramos M.D.      Chief Complaint   Patient presents with     Rectal Bleeding     Back Pain         FINAL IMPRESSION:  1. Rectal bleeding    2. Iron deficiency anemia, unspecified iron deficiency anemia type          ED COURSE & MEDICAL DECISION MAKING:    Pertinent Labs & Imaging studies reviewed. (See chart for details)  ED Course as of 02/18/22 2137 Fri Feb 18, 2022 1921 Patient is a 32-year-old gentleman who presents with about a week of bright red blood per rectum, up to 10 times a day.  He feels a bit weak and lightheaded.  No pain in his abdomen but he feels symptomatic left flank pain before he has a bowel movement.  On exam he is well-appearing, blood pressure 134 systolic and heart rate in the 80s.  His initial hemoglobin here is 10.8 with normal white blood cell count and creatinine.  No evidence of significant volume depletion.  Differential includes diverticulosis, internal hemorrhoid, AVM.  He also notes that this was an issue many years ago when he lived in [facility] and it was related to exertion, will get a CTA to rule out any evidence of bowel ischemia as well.   1958 Patient returned from CT scan, was sneezing uncontrollably then began scratching the back of his neck.  He denies any itching anywhere else in his body currently.  I added IV contrast as an allergy, give him a liter of fluids and Benadryl.   2025 CTA Abdomen Pelvis with Contrast  1.  Nothing for active GI bleeding.     2.  Normal bowel loops with no acute inflammatory change.     3.  No significant incidental finding.   2046 I rechecked the patient, no urticaria, no facial swelling, no sign of severe anaphylactic reaction.  His hemoglobin is low at 10.8 but he is not show any signs of hypovolemia currently.  Is  unclear what is recent hemoglobin trend has been.  He is eating and drinking well, no abdominal pain, this is likely diverticular bleed or polyp, he will need outpatient GI work-up.  In the meantime I spoke to him about follow-up appointment in 1 week at the [place] [facility] for hemoglobin recheck.  He will have somebody call for him on Monday to do this.  Discussed return precautions.         7:05 PM I met the patient and performed my initial interview and exam.   8:35 PM I rechecked and updated the patient   8:45 PM We discussed the plan for discharge and the patient is agreeable. Reviewed supportive cares, symptomatic treatment, outpatient follow up, and reasons to return to the Emergency Department. All questions and concerns were addressed. Patient to be discharged by ED RN.       At the conclusion of the encounter I discussed the results of all of the tests and the disposition. The questions were answered. The patient or family acknowledged understanding and was agreeable with the care plan.     MEDICATIONS GIVEN IN THE EMERGENCY:  Medications   diphenhydrAMINE (BENADRYL) injection 25 mg (25 mg Intravenous Given 2/18/22 2023)   0.9% sodium chloride BOLUS (0 mLs Intravenous Stopped 2/18/22 2053)   iopamidol (ISOVUE-370) solution 100 mL (100 mLs Intravenous Given 2/18/22 1959)         NEW PRESCRIPTIONS STARTED AT TODAY'S ER VISIT  Discharge Medication List as of 2/18/2022  8:54 PM             =================================================================    HPI    Patient information was obtained from: Patient     Use of : Phone  Language Stanley Dudley is a 32 year old male with a pertinent history of rectal bleeding who presents to this ED for evaluation of rectal bleeding.        Since last week Sunday, the patient reports having blood in his stool. The patient states that he will have blood in his stool every time he has a bowel movement which he says can be up to 10 times per  day. He notes that sometimes only blood will come out in clots. He also reports having a lower back pain which causes him have a bowel movement. He states that this is the longest he has ever had blood in his stool. The patient states that he will feel lightheaded when he sees the blood. He denies any abdominal pain or any other complaints at this time. The patient says he is eating and drinking normally.     The patient reports a previous history of blood in his stool once before. He states that the blood only lasted for a day after he did some strenuous exercise or heavy lifting.            REVIEW OF SYSTEMS   Review of Systems   Gastrointestinal: Positive for blood in stool. Negative for abdominal pain.   Musculoskeletal: Positive for back pain.   Neurological: Positive for light-headedness (secondary to seeing blood).   All other systems reviewed and are negative.       PAST MEDICAL HISTORY:  Past Medical History:   Diagnosis Date     Ear problem     chronic     Malaria      Rectal bleeding 08/01/2018     Syncope     happened twice, once age 21; referred to cardiology; if they find nothing, then neuro for seizure       PAST SURGICAL HISTORY:  Past Surgical History:   Procedure Laterality Date     NO PAST SURGERIES             CURRENT MEDICATIONS:    No current facility-administered medications for this encounter.     Current Outpatient Medications   Medication     acetaminophen (TYLENOL) 325 MG tablet     amitriptyline (ELAVIL) 25 MG tablet     viscous lidocaine HC (LIDOCAINE) 2 % Soln viscous solution       ALLERGIES:  Allergies   Allergen Reactions     Contrast Dye Itching       FAMILY HISTORY:  No family history on file.    SOCIAL HISTORY:   Social History     Socioeconomic History     Marital status:      Spouse name: Not on file     Number of children: Not on file     Years of education: Not on file     Highest education level: Not on file   Occupational History     Not on file   Tobacco Use      Smoking status: Never Smoker     Smokeless tobacco: Never Used   Substance and Sexual Activity     Alcohol use: No     Drug use: No     Sexual activity: Never   Other Topics Concern     Not on file   Social History Narrative    As of 8/1/18    [place]    Living situation: lives with his sister and her family ( and one child), in an apartment      [place]: parent, brother all in [place]  Family [place]: sister    Languages spoken: Karenni, some  Welsh      Past employment: /teacher- primary      [place] [facility] [facility]      Education: 10th grade      [employer]ism: Pentec[employer]al       Social Determinants of Health     Financial Resource Strain: Not on file   Food Insecurity: Not on file   Transportation Needs: Not on file   Physical Activity: Not on file   Stress: Not on file   Social Connections: Not on file   Intimate Partner Violence: Not on file   Housing Stability: Not on file       VITALS:  /74   Pulse 82   Temp 98.4  F (36.9  C) (Oral)   Resp 16   Wt 69.9 kg (154 lb)   SpO2 100%   BMI 27.98 kg/m      PHYSICAL EXAM    Constitutional: Well developed, well nourished. Comfortable appearing.  HENT: Normocephalic, atraumatic, mucous membranes moist, nose normal. Neck- Supple, gross ROM intact.   Eyes: Pupils mid-range, conjunctiva without injection, no discharge.   Respiratory: Clear to auscultation bilaterally, no respiratory distress, no wheezing, speaks full sentences easily. No cough.  Cardiovascular: Normal heart rate, regular rhythm, no murmurs. No pedal edema, 2+ DP pulses.   GI: Soft, no tenderness to deep palpation in all quadrants, no masses.  Musculoskeletal: Moving all 4 extremities intentionally and without pain. No obvious deformity.  Skin: Warm, dry, no rash.  Neurologic: Alert & oriented x 3, cranial nerves grossly intact.  Psychiatric: Affect normal, cooperative.       LAB:  All pertinent labs reviewed and interpreted.  Labs Ordered and Resulted from  Time of ED Arrival to Time of ED Departure   CBC WITH PLATELETS AND DIFFERENTIAL - Abnormal       Result Value    WBC Count 5.2      RBC Count 4.48      Hemoglobin 10.8 (*)     Hematocrit 35.1 (*)     MCV 78      MCH 24.1 (*)     MCHC 30.8 (*)     RDW 14.3      Platelet Count 264      % Neutrophils 35      % Lymphocytes 46      % Monocytes 11      % Eosinophils 7      % Basophils 1      % Immature Granulocytes 0      NRBCs per 100 WBC 0      Absolute Neutrophils 1.8      Absolute Lymphocytes 2.4      Absolute Monocytes 0.6      Absolute Eosinophils 0.4      Absolute Basophils 0.1      Absolute Immature Granulocytes 0.0      Absolute NRBCs 0.0     INR - Abnormal    INR 1.16 (*)    COMPREHENSIVE METABOLIC PANEL - Normal    Sodium 139      Potassium 4.2      Chloride 107      Carbon Dioxide (CO2) 27      Anion Gap 5      Urea Nitrogen 13      Creatinine 0.92      Calcium 8.8      Glucose 97      Alkaline Phosphatase 70      AST 20      ALT 18      Protein Total 7.6      Albumin 4.0      Bilirubin Total 0.3      GFR Estimate >90         RADIOLOGY:  Reviewed all pertinent imaging. Please see official radiology report.  CTA Abdomen Pelvis with Contrast   Final Result   IMPRESSION:   1.  Nothing for active GI bleeding.      2.  Normal bowel loops with no acute inflammatory change.      3.  No significant incidental finding.          EKG:    All EKG interpretations will be found in ED course above.      I, Mi Gonzalez am serving as a scribe to document services personally performed by Dr. Kenroy Ramos based on my observation and the provider's statements to me. I, Kenroy Ramos MD attest that Mi Gonzalez is acting in a scribe capacity, has observed my performance of the services and has documented them in accordance with my direction.    Kenroy Ramos M.D.  Emergency Medicine  [facility] EMERGENCY DEPARTMENT  [address] [place]  [id number]  [phone]  Dept: [phone]     Kenroy Ramos MD  02/18/22 [id number]